# Patient Record
Sex: MALE | Race: WHITE | NOT HISPANIC OR LATINO | Employment: OTHER | URBAN - METROPOLITAN AREA
[De-identification: names, ages, dates, MRNs, and addresses within clinical notes are randomized per-mention and may not be internally consistent; named-entity substitution may affect disease eponyms.]

---

## 2022-11-25 ENCOUNTER — HOSPITAL ENCOUNTER (EMERGENCY)
Facility: CLINIC | Age: 52
Discharge: HOME | End: 2022-11-25
Attending: EMERGENCY MEDICINE | Admitting: EMERGENCY MEDICINE
Payer: COMMERCIAL

## 2022-11-25 ENCOUNTER — APPOINTMENT (OUTPATIENT)
Dept: GENERAL RADIOLOGY | Facility: CLINIC | Age: 52
End: 2022-11-25
Attending: EMERGENCY MEDICINE
Payer: COMMERCIAL

## 2022-11-25 VITALS
HEIGHT: 77 IN | DIASTOLIC BLOOD PRESSURE: 85 MMHG | TEMPERATURE: 98 F | RESPIRATION RATE: 14 BRPM | SYSTOLIC BLOOD PRESSURE: 138 MMHG | OXYGEN SATURATION: 96 % | HEART RATE: 58 BPM | BODY MASS INDEX: 33.65 KG/M2 | WEIGHT: 285 LBS

## 2022-11-25 DIAGNOSIS — R07.9 CHEST PAIN, UNSPECIFIED TYPE: ICD-10-CM

## 2022-11-25 LAB
ALBUMIN SERPL-MCNC: 3.4 G/DL (ref 3.4–5)
ALP SERPL-CCNC: 71 U/L (ref 40–150)
ALT SERPL W P-5'-P-CCNC: 41 U/L (ref 0–70)
ANION GAP SERPL CALCULATED.3IONS-SCNC: 7 MMOL/L (ref 3–14)
AST SERPL W P-5'-P-CCNC: 20 U/L (ref 0–45)
BASOPHILS # BLD AUTO: 0 10E3/UL (ref 0–0.2)
BASOPHILS NFR BLD AUTO: 1 %
BILIRUB SERPL-MCNC: 0.6 MG/DL (ref 0.2–1.3)
BUN SERPL-MCNC: 17 MG/DL (ref 7–30)
CALCIUM SERPL-MCNC: 9.3 MG/DL (ref 8.5–10.1)
CHLORIDE BLD-SCNC: 107 MMOL/L (ref 94–109)
CO2 SERPL-SCNC: 26 MMOL/L (ref 20–32)
CREAT SERPL-MCNC: 1.12 MG/DL (ref 0.66–1.25)
D DIMER PPP FEU-MCNC: 0.36 UG/ML FEU (ref 0–0.5)
EOSINOPHIL # BLD AUTO: 0.4 10E3/UL (ref 0–0.7)
EOSINOPHIL NFR BLD AUTO: 6 %
ERYTHROCYTE [DISTWIDTH] IN BLOOD BY AUTOMATED COUNT: 12.6 % (ref 10–15)
GFR SERPL CREATININE-BSD FRML MDRD: 80 ML/MIN/1.73M2
GLUCOSE BLD-MCNC: 97 MG/DL (ref 70–99)
HCT VFR BLD AUTO: 48.7 % (ref 40–53)
HGB BLD-MCNC: 16.7 G/DL (ref 13.3–17.7)
HOLD SPECIMEN: NORMAL
HOLD SPECIMEN: NORMAL
IMM GRANULOCYTES # BLD: 0 10E3/UL
IMM GRANULOCYTES NFR BLD: 0 %
LYMPHOCYTES # BLD AUTO: 1.2 10E3/UL (ref 0.8–5.3)
LYMPHOCYTES NFR BLD AUTO: 19 %
MCH RBC QN AUTO: 30.6 PG (ref 26.5–33)
MCHC RBC AUTO-ENTMCNC: 34.3 G/DL (ref 31.5–36.5)
MCV RBC AUTO: 89 FL (ref 78–100)
MONOCYTES # BLD AUTO: 0.6 10E3/UL (ref 0–1.3)
MONOCYTES NFR BLD AUTO: 9 %
NEUTROPHILS # BLD AUTO: 4.4 10E3/UL (ref 1.6–8.3)
NEUTROPHILS NFR BLD AUTO: 65 %
NRBC # BLD AUTO: 0 10E3/UL
NRBC BLD AUTO-RTO: 0 /100
PLATELET # BLD AUTO: 223 10E3/UL (ref 150–450)
POTASSIUM BLD-SCNC: 4.1 MMOL/L (ref 3.4–5.3)
PROT SERPL-MCNC: 7.2 G/DL (ref 6.8–8.8)
RBC # BLD AUTO: 5.46 10E6/UL (ref 4.4–5.9)
SODIUM SERPL-SCNC: 140 MMOL/L (ref 133–144)
TROPONIN I SERPL HS-MCNC: 5 NG/L
TROPONIN I SERPL HS-MCNC: 6 NG/L
WBC # BLD AUTO: 6.6 10E3/UL (ref 4–11)

## 2022-11-25 PROCEDURE — 71046 X-RAY EXAM CHEST 2 VIEWS: CPT

## 2022-11-25 PROCEDURE — 85025 COMPLETE CBC W/AUTO DIFF WBC: CPT | Performed by: EMERGENCY MEDICINE

## 2022-11-25 PROCEDURE — 250N000013 HC RX MED GY IP 250 OP 250 PS 637: Performed by: EMERGENCY MEDICINE

## 2022-11-25 PROCEDURE — 85379 FIBRIN DEGRADATION QUANT: CPT | Performed by: EMERGENCY MEDICINE

## 2022-11-25 PROCEDURE — 80053 COMPREHEN METABOLIC PANEL: CPT | Performed by: EMERGENCY MEDICINE

## 2022-11-25 PROCEDURE — 99284 EMERGENCY DEPT VISIT MOD MDM: CPT | Mod: 25

## 2022-11-25 PROCEDURE — 84484 ASSAY OF TROPONIN QUANT: CPT | Performed by: EMERGENCY MEDICINE

## 2022-11-25 PROCEDURE — 36415 COLL VENOUS BLD VENIPUNCTURE: CPT | Performed by: EMERGENCY MEDICINE

## 2022-11-25 RX ORDER — ASPIRIN 81 MG/1
324 TABLET, CHEWABLE ORAL ONCE
Status: COMPLETED | OUTPATIENT
Start: 2022-11-25 | End: 2022-11-25

## 2022-11-25 RX ADMIN — ASPIRIN 81 MG CHEWABLE TABLET 324 MG: 81 TABLET CHEWABLE at 11:57

## 2022-11-25 ASSESSMENT — ENCOUNTER SYMPTOMS
SHORTNESS OF BREATH: 0
ABDOMINAL PAIN: 0
CHEST TIGHTNESS: 1

## 2022-11-25 NOTE — ED NOTES
Rapid Assessment Note    History:   Suresh Nino is a 52 year old male with a history of hypertension who presents with his wife for chest tightness. For two weeks, patient has had intermitted chest thightness. Yesterday in particular, he noticed a change in his chest tightness, as it radiated down his left arm now for four hours. He notes that sometimes the pain radiates to his jaw intermittently. He has never had these changes with his symptoms before, prompting him to come get checked. Patient denies having any abdominal pain or shortness of breath. Further denies history of diabetes or acid reflex. Denies the use of drugs or smoking. Denies having a stress test before. Patient works as a .    Exam:   General:  Alert, interactive  Cardiovascular:  Well perfused  Lungs:  No respiratory distress, no accessory muscle use  Neuro:  Moving all 4 extremities  Skin:  Warm, dry  Psych:  Normal affect  ***    Plan of Care:   I evaluated the patient and developed an initial plan of care. I discussed this plan and explained that I, or one of my partners, would be returning to complete the evaluation.         I, Radha Case, am serving as a scribe to document services personally performed by Jeffery Mancilla L, MD, based on my observations and the provider's statements to me.    11/25/2022  [facility]    Portions of this medical record were completed by a scribe. UPON MY REVIEW AND AUTHENTICATION BY ELECTRONIC SIGNATURE, this confirms (a) I performed the applicable clinical services, and (b) the record is accurate.

## 2022-11-25 NOTE — ED PROVIDER NOTES
"  History   Chief Complaint:  Chest Tightness    The history is provided by the patient.      Suresh Nino is a 52 year old male with a history of hypertension who presents with his wife for chest tightness. For two weeks, patient has had intermitted chest thightness. Yesterday in particular, he noticed a change in his chest tightness, as it radiated down his left arm now for four hours. He notes that sometimes the pain radiates to his jaw intermittently. He has never had these changes with his symptoms before, prompting him to come get checked. Patient denies having any abdominal pain or shortness of breath. Further denies history of diabetes or acid reflex. Denies the use of drugs or smoking. Denies having a stress test before. Patient works as a .    Review of Systems   Respiratory: Positive for chest tightness. Negative for shortness of breath.    Gastrointestinal: Negative for abdominal pain.   All other systems reviewed and are negative.    Allergies:  The patient has no known allergies.     Medications:  Patient is not taking any medications,     Past Medical History:     Mild obstructive sleep apnea  Hypertension  Syncope and collapse  Nasal polyps  Chronic nasal congestion  Mild persistent asthma     Past Surgical History:    Sinus surgery    Family History:    Father: Heart Disease     Social History:  The patient presents to the ED with his wife.  Tobacco Use: None  Drug Use: None    Physical Exam     Patient Vitals for the past 24 hrs:   BP Temp Temp src Pulse Resp SpO2 Height Weight   11/25/22 1603 138/85 -- -- 58 14 96 % -- --   11/25/22 1058 (!) 149/79 98  F (36.7  C) Oral 80 16 96 % 1.956 m (6' 5\") 129.3 kg (285 lb)     Physical Exam    GENERAL: well developed, pleasant  HEAD: atraumatic  EYES: pupils reactive, extraocular muscles intact, conjunctivae normal  ENT:  mucus membranes moist  NECK:  trachea midline, normal range of motion  RESPIRATORY: no tachypnea, breath sounds clear to " auscultation   CVS: normal S1/S2, no murmurs, intact distal pulses  ABDOMEN: soft, nontender, nondistention  MUSCULOSKELETAL: no deformities  SKIN: warm and dry, no acute rashes or ulceration  NEURO: GCS 15, cranial nerves intact, alert and oriented x3  PSYCH:  Mood/affect normal    Emergency Department Course     Imaging:  Chest XR,  PA & LAT   Final Result   IMPRESSION: Negative chest. Lungs clear.      LISA KELSEY MD            SYSTEM ID:  [id number]        Report per radiology    Laboratory:  Labs Ordered and Resulted from Time of ED Arrival to Time of ED Departure   COMPREHENSIVE METABOLIC PANEL - Normal       Result Value    Sodium 140      Potassium 4.1      Chloride 107      Carbon Dioxide (CO2) 26      Anion Gap 7      Urea Nitrogen 17      Creatinine 1.12      Calcium 9.3      Glucose 97      Alkaline Phosphatase 71      AST 20      ALT 41      Protein Total 7.2      Albumin 3.4      Bilirubin Total 0.6      GFR Estimate 80     TROPONIN I - Normal    Troponin I High Sensitivity 5     D DIMER QUANTITATIVE - Normal    D-Dimer Quantitative 0.36     TROPONIN I - Normal    Troponin I High Sensitivity 6     CBC WITH PLATELETS AND DIFFERENTIAL    WBC Count 6.6      RBC Count 5.46      Hemoglobin 16.7      Hematocrit 48.7      MCV 89      MCH 30.6      MCHC 34.3      RDW 12.6      Platelet Count 223      % Neutrophils 65      % Lymphocytes 19      % Monocytes 9      % Eosinophils 6      % Basophils 1      % Immature Granulocytes 0      NRBCs per 100 WBC 0      Absolute Neutrophils 4.4      Absolute Lymphocytes 1.2      Absolute Monocytes 0.6      Absolute Eosinophils 0.4      Absolute Basophils 0.0      Absolute Immature Granulocytes 0.0      Absolute NRBCs 0.0       Emergency Department Course:     Reviewed:  I reviewed nursing notes, vitals, past medical history, Care Everywhere and [facility]    Assessments:  1153 I obtained history and examined the patient as noted above.   1616 I rechecked the patient and  explained findings.    Interventions:  Medications   aspirin (ASA) chewable tablet 324 mg (324 mg Oral Given 11/25/22 3474)     Disposition:  The patient was discharged to home.     Impression & Plan     CMS Diagnoses: None.    Medical Decision Making:  Patient presents with chest pain/epigastric pain that has been on and off for a couple of weeks.  Cardiac, gastritis, PE, musculoskeletal amongst others are considered.  He does have a few risk factors for heart disease.  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  EKG, troponin x2, and the rest of his workup is normal.  Unfortunately the day after Thanksgiving the ED was extremely busy and he spent the entire visit in the waiting room.  I went and updated him frequently with the plan and apologized  for the busy department.  Will have him start prilosec OTC and get out patient stress test.  If his symptoms return or worsen he is to return to the ED.       Diagnosis:    ICD-10-CM    1. Chest pain, unspecified type  R07.9         Discharge Medications:  New Prescriptions    No medications on file     Scribe Disclosure:  I, Kathryndami Evie, am serving as a scribe at 3:33 PM on 11/25/2022 to document services personally performed by Jeffery Mancilla MD based on my observations and the provider's statements to me.       Jeffery Mancilla MD  11/25/22 2440

## 2022-11-25 NOTE — ED TRIAGE NOTES
Chest pain for 3 weeks, radiation to L arm started yesterday   Triage Assessment     Row Name 11/25/22 1137       Triage Assessment (Adult)    Airway WDL WDL       Respiratory WDL    Respiratory WDL WDL       Skin Circulation/Temperature WDL    Skin Circulation/Temperature WDL WDL       Cardiac WDL    Cardiac WDL X;chest pain       Chest Pain Assessment    Chest Pain Location midsternal    Chest Pain Radiation arm    Character pressure       Peripheral/Neurovascular WDL    Peripheral Neurovascular WDL WDL       Cognitive/Neuro/Behavioral WDL    Cognitive/Neuro/Behavioral WDL WDL

## 2022-12-09 ENCOUNTER — ANCILLARY PROCEDURE (OUTPATIENT)
Dept: CARDIOLOGY | Facility: CLINIC | Age: 52
End: 2022-12-09
Attending: EMERGENCY MEDICINE
Payer: COMMERCIAL

## 2022-12-09 DIAGNOSIS — R07.9 CHEST PAIN, UNSPECIFIED TYPE: ICD-10-CM

## 2022-12-09 PROCEDURE — 93016 CV STRESS TEST SUPVJ ONLY: CPT | Performed by: INTERNAL MEDICINE

## 2022-12-09 PROCEDURE — 93018 CV STRESS TEST I&R ONLY: CPT | Performed by: INTERNAL MEDICINE

## 2022-12-09 PROCEDURE — 93017 CV STRESS TEST TRACING ONLY: CPT | Performed by: INTERNAL MEDICINE

## 2022-12-09 PROCEDURE — 93321 DOPPLER ECHO F-UP/LMTD STD: CPT | Performed by: INTERNAL MEDICINE

## 2022-12-09 PROCEDURE — 93350 STRESS TTE ONLY: CPT | Performed by: INTERNAL MEDICINE

## 2022-12-09 PROCEDURE — 93325 DOPPLER ECHO COLOR FLOW MAPG: CPT | Performed by: INTERNAL MEDICINE

## 2022-12-09 RX ADMIN — Medication 5 ML: at 08:32

## 2023-02-12 ENCOUNTER — HEALTH MAINTENANCE LETTER (OUTPATIENT)
Age: 53
End: 2023-02-12

## 2024-03-10 ENCOUNTER — HEALTH MAINTENANCE LETTER (OUTPATIENT)
Age: 54
End: 2024-03-10

## 2025-03-16 ENCOUNTER — HEALTH MAINTENANCE LETTER (OUTPATIENT)
Age: 55
End: 2025-03-16